# Patient Record
Sex: FEMALE | Race: WHITE | URBAN - METROPOLITAN AREA
[De-identification: names, ages, dates, MRNs, and addresses within clinical notes are randomized per-mention and may not be internally consistent; named-entity substitution may affect disease eponyms.]

---

## 2020-07-25 ENCOUNTER — TELEPHONE ENCOUNTER (OUTPATIENT)
Dept: URBAN - METROPOLITAN AREA CLINIC 13 | Facility: CLINIC | Age: 75
End: 2020-07-25

## 2020-07-25 RX ORDER — POLYETHYLENE GLYCOL 3350, SODIUM CHLORIDE, SODIUM BICARBONATE AND POTASSIUM CHLORIDE WITH LEMON FLAVOR 420; 11.2; 5.72; 1.48 G/4L; G/4L; G/4L; G/4L
TAKE 1/2 GALLON AT 5:00 PM DAY BEFORE PROCEDURE, TAKE SECOND 1/2 OF GALLON 6 HRS PRIOR TO PROCEDURE POWDER, FOR SOLUTION ORAL
Qty: 1 | Refills: 0 | OUTPATIENT
Start: 2018-12-31 | End: 2019-01-07

## 2020-07-26 ENCOUNTER — TELEPHONE ENCOUNTER (OUTPATIENT)
Dept: URBAN - METROPOLITAN AREA CLINIC 13 | Facility: CLINIC | Age: 75
End: 2020-07-26

## 2020-07-26 RX ORDER — TOBRAMYCIN AND DEXAMETHASONE 3; 1 MG/ML; MG/ML
SUSPENSION/ DROPS OPHTHALMIC
Qty: 5 | Refills: 0 | Status: ACTIVE | COMMUNITY
Start: 2018-08-20

## 2020-07-26 RX ORDER — TRAMADOL HYDROCHLORIDE 50 MG/1
TABLET ORAL
Qty: 20 | Refills: 0 | Status: ACTIVE | COMMUNITY
Start: 2018-07-12

## 2020-07-26 RX ORDER — CEPHALEXIN 500 MG/1
CAPSULE ORAL
Qty: 30 | Refills: 0 | Status: ACTIVE | COMMUNITY
Start: 2018-08-20

## 2020-07-26 RX ORDER — LOSARTAN POTASSIUM 100 MG/1
TABLET, FILM COATED ORAL
Qty: 90 | Refills: 0 | Status: ACTIVE | COMMUNITY
Start: 2018-10-18

## 2020-07-26 RX ORDER — LOSARTAN POTASSIUM 100 MG/1
TAKE 1 TABLET DAILY TABLET, FILM COATED ORAL
Refills: 0 | Status: ACTIVE | COMMUNITY

## 2025-03-11 ENCOUNTER — DASHBOARD ENCOUNTERS (OUTPATIENT)
Age: 80
End: 2025-03-11

## 2025-03-11 ENCOUNTER — LAB OUTSIDE AN ENCOUNTER (OUTPATIENT)
Dept: URBAN - METROPOLITAN AREA CLINIC 107 | Facility: CLINIC | Age: 80
End: 2025-03-11

## 2025-03-11 ENCOUNTER — OFFICE VISIT (OUTPATIENT)
Dept: URBAN - METROPOLITAN AREA CLINIC 107 | Facility: CLINIC | Age: 80
End: 2025-03-11
Payer: COMMERCIAL

## 2025-03-11 VITALS
SYSTOLIC BLOOD PRESSURE: 146 MMHG | OXYGEN SATURATION: 97 % | HEART RATE: 69 BPM | WEIGHT: 154 LBS | RESPIRATION RATE: 18 BRPM | DIASTOLIC BLOOD PRESSURE: 83 MMHG | HEIGHT: 60 IN | TEMPERATURE: 97.2 F | BODY MASS INDEX: 30.23 KG/M2

## 2025-03-11 DIAGNOSIS — E73.9 LACTOSE INTOLERANCE: ICD-10-CM

## 2025-03-11 DIAGNOSIS — R11.0 CHRONIC NAUSEA: ICD-10-CM

## 2025-03-11 DIAGNOSIS — R19.7 CHRONIC DIARRHEA: ICD-10-CM

## 2025-03-11 DIAGNOSIS — R63.4 WEIGHT LOSS, UNINTENTIONAL: ICD-10-CM

## 2025-03-11 PROBLEM — 448765001: Status: ACTIVE | Noted: 2025-03-11

## 2025-03-11 PROBLEM — 236071009: Status: ACTIVE | Noted: 2025-03-11

## 2025-03-11 PROBLEM — 422587007: Status: ACTIVE | Noted: 2025-03-11

## 2025-03-11 PROBLEM — 782415009: Status: ACTIVE | Noted: 2025-03-11

## 2025-03-11 PROCEDURE — 99203 OFFICE O/P NEW LOW 30 MIN: CPT | Performed by: INTERNAL MEDICINE

## 2025-03-11 RX ORDER — MELOXICAM 15 MG/1
TABLET ORAL
Qty: 90 EACH | Refills: 1 | Status: ACTIVE | COMMUNITY

## 2025-03-11 RX ORDER — LOSARTAN POTASSIUM 100 MG/1
TAKE 1 TABLET DAILY TABLET, FILM COATED ORAL
Refills: 0 | Status: ACTIVE | COMMUNITY

## 2025-03-11 RX ORDER — TRAMADOL HYDROCHLORIDE 50 MG/1
TABLET, FILM COATED ORAL
Qty: 20 | Refills: 0 | Status: ACTIVE | COMMUNITY
Start: 2018-07-12

## 2025-03-11 RX ORDER — AMLODIPINE BESYLATE 10 MG/1
TABLET ORAL
Qty: 90 EACH | Refills: 1 | Status: ACTIVE | COMMUNITY

## 2025-03-11 RX ORDER — TOBRAMYCIN AND DEXAMETHASONE 3; 1 MG/ML; MG/ML
SUSPENSION/ DROPS OPHTHALMIC
Qty: 5 | Refills: 0 | Status: DISCONTINUED | COMMUNITY
Start: 2018-08-20

## 2025-03-11 RX ORDER — CEPHALEXIN 500 MG/1
CAPSULE ORAL
Qty: 30 | Refills: 0 | Status: DISCONTINUED | COMMUNITY
Start: 2018-08-20

## 2025-03-11 NOTE — HPI-TODAY'S VISIT:
79-year-old with a history of diabetes mellitus, hypertension, hyperlipidemia, sleep apnea who presents for evaluation of diarrhea and mucus in her stool.  She was evaluated by Dr. Sena previously in 2018 and 2019 for similar complaints.  She would like to continue with me  In 2018/2019 TTG celiac sprue test was negative, ova and parasites negative, Giardia negative and fecal fat were negative.  TSH was normal at 2.69.  He performed a colonoscopy on 1/7/2019 that revealed a normal terminal ileum, the colon was normal and random biopsies revealed no significant abnormality specifically no microscopic colitis.  There were a few small sigmoid diverticuli.  For the last 2 months she has had nausea.  It comes and goes but is there most of the time.  It does not seem to be worse with meals.  She does not have any vomiting.  She has had some anorexia and has lost about 8 pounds in the last month or so.  She seldom has heartburn and denies any dysphagia there is been no abdominal pain gaseousness or bloating.  Her bowel movements occur about 4-5 times per day then she can skip a day or 2 and then have 3 in the morning.  These vary from soft to sometimes watery.  She is trying to follow a lactose-free diet.  There is been no bright red blood or melena.  There is been no skin rashes.  She does use meloxicam and sometimes uses Aleve.  She states that she is living on [facility].  Blood work on 1/2/2025 revealed a hemoglobin 12.1, WBC of 5.4 and platelet count 269,000.  Sodium 141 potassium 4.6 BUN 12 creatinine 0.72.  AST 23, ALT 21, alk phosphatase 78, total bili 0.4, albumin 4.4, TSH is 4.5, A1c is 6.4.

## 2025-03-19 ENCOUNTER — TELEPHONE ENCOUNTER (OUTPATIENT)
Dept: URBAN - METROPOLITAN AREA CLINIC 107 | Facility: CLINIC | Age: 80
End: 2025-03-19

## 2025-03-21 ENCOUNTER — TELEPHONE ENCOUNTER (OUTPATIENT)
Dept: URBAN - METROPOLITAN AREA CLINIC 107 | Facility: CLINIC | Age: 80
End: 2025-03-21

## 2025-04-14 ENCOUNTER — CLAIMS CREATED FROM THE CLAIM WINDOW (OUTPATIENT)
Dept: URBAN - METROPOLITAN AREA CLINIC 4 | Facility: CLINIC | Age: 80
End: 2025-04-14
Payer: COMMERCIAL

## 2025-04-14 ENCOUNTER — CLAIMS CREATED FROM THE CLAIM WINDOW (OUTPATIENT)
Dept: URBAN - METROPOLITAN AREA SURGERY CENTER 25 | Facility: SURGERY CENTER | Age: 80
End: 2025-04-14
Payer: COMMERCIAL

## 2025-04-14 DIAGNOSIS — K31.89 OTHER DISEASES OF STOMACH AND DUODENUM: ICD-10-CM

## 2025-04-14 DIAGNOSIS — K31.89 GASTRIC FOVEOLAR HYPERPLASIA: ICD-10-CM

## 2025-04-14 DIAGNOSIS — K29.70 GASTRITIS, UNSPECIFIED, WITHOUT BLEEDING: ICD-10-CM

## 2025-04-14 DIAGNOSIS — K29.60 OTHER GASTRITIS WITHOUT BLEEDING: ICD-10-CM

## 2025-04-14 PROCEDURE — 43239 EGD BIOPSY SINGLE/MULTIPLE: CPT | Performed by: INTERNAL MEDICINE

## 2025-04-14 PROCEDURE — 00731 ANES UPR GI NDSC PX NOS: CPT | Performed by: NURSE ANESTHETIST, CERTIFIED REGISTERED

## 2025-04-14 PROCEDURE — 88305 TISSUE EXAM BY PATHOLOGIST: CPT | Performed by: PATHOLOGY

## 2025-04-14 PROCEDURE — 88312 SPECIAL STAINS GROUP 1: CPT | Performed by: PATHOLOGY

## 2025-04-14 RX ORDER — AMLODIPINE BESYLATE 10 MG/1
TABLET ORAL
Qty: 90 EACH | Refills: 1 | Status: ACTIVE | COMMUNITY

## 2025-04-14 RX ORDER — MELOXICAM 15 MG/1
TABLET ORAL
Qty: 90 EACH | Refills: 1 | Status: ACTIVE | COMMUNITY

## 2025-04-14 RX ORDER — LOSARTAN POTASSIUM 100 MG/1
TAKE 1 TABLET DAILY TABLET, FILM COATED ORAL
Refills: 0 | Status: ACTIVE | COMMUNITY

## 2025-04-14 RX ORDER — TRAMADOL HYDROCHLORIDE 50 MG/1
TABLET, FILM COATED ORAL
Qty: 20 | Refills: 0 | Status: ACTIVE | COMMUNITY
Start: 2018-07-12

## 2025-05-06 ENCOUNTER — TELEPHONE ENCOUNTER (OUTPATIENT)
Dept: URBAN - METROPOLITAN AREA CLINIC 107 | Facility: CLINIC | Age: 80
End: 2025-05-06

## 2025-05-07 ENCOUNTER — OFFICE VISIT (OUTPATIENT)
Dept: URBAN - METROPOLITAN AREA CLINIC 107 | Facility: CLINIC | Age: 80
End: 2025-05-07
Payer: COMMERCIAL

## 2025-05-07 ENCOUNTER — LAB OUTSIDE AN ENCOUNTER (OUTPATIENT)
Dept: URBAN - METROPOLITAN AREA CLINIC 107 | Facility: CLINIC | Age: 80
End: 2025-05-07

## 2025-05-07 DIAGNOSIS — R63.4 UNINTENTIONAL WEIGHT LOSS: ICD-10-CM

## 2025-05-07 DIAGNOSIS — E73.9 LACTOSE INTOLERANCE: ICD-10-CM

## 2025-05-07 DIAGNOSIS — R19.01 RIGHT UPPER QUADRANT ABDOMINAL MASS: ICD-10-CM

## 2025-05-07 DIAGNOSIS — R11.0 NAUSEA: ICD-10-CM

## 2025-05-07 DIAGNOSIS — K52.9 CHRONIC DIARRHEA: ICD-10-CM

## 2025-05-07 DIAGNOSIS — R93.5 ABNORMAL ABDOMINAL CT SCAN: ICD-10-CM

## 2025-05-07 PROCEDURE — 99214 OFFICE O/P EST MOD 30 MIN: CPT | Performed by: NURSE PRACTITIONER

## 2025-05-07 RX ORDER — AMLODIPINE BESYLATE 10 MG/1
TABLET ORAL
Qty: 90 EACH | Refills: 1 | Status: ACTIVE | COMMUNITY

## 2025-05-07 RX ORDER — LOSARTAN POTASSIUM 100 MG/1
TAKE 1 TABLET DAILY TABLET, FILM COATED ORAL
Refills: 0 | Status: ACTIVE | COMMUNITY

## 2025-05-07 RX ORDER — TRAMADOL HYDROCHLORIDE 50 MG/1
TABLET, FILM COATED ORAL
Qty: 20 | Refills: 0 | Status: ACTIVE | COMMUNITY
Start: 2018-07-12

## 2025-05-07 RX ORDER — ONDANSETRON 4 MG/1
1 TABLET ON THE TONGUE AND ALLOW TO DISSOLVE TABLET, ORALLY DISINTEGRATING ORAL
Qty: 30 TABLET | Refills: 1 | OUTPATIENT
Start: 2025-05-07

## 2025-05-07 RX ORDER — MELOXICAM 15 MG/1
TABLET ORAL
Qty: 90 EACH | Refills: 1 | Status: ACTIVE | COMMUNITY

## 2025-05-07 NOTE — HPI-TODAY'S VISIT:
79-year-old with a history of diabetes mellitus, hypertension, hyperlipidemia, sleep apnea who presents for EGD follow-up. She was evaluated by Dr. Sena previously in 2018 and 2019 for similar   Last seen 3/11/2025 for chronic nausea, weight loss, lactose intolerance and chronic diarrhea. EGD ordered for further evaluation. She does use NSAIDs it was recommended she discontinue and take acetaminophen instead. Chronic diarrhea recommend Benefiber and avoidance of lactose.  In 2018/2019 TTG celiac sprue test was negative, ova and parasites negative, Giardia negative and fecal fat were negative.  TSH was normal at 2.69.  He performed a colonoscopy on 1/7/2019 that revealed a normal terminal ileum, the colon was normal and random biopsies revealed no significant abnormality specifically no microscopic colitis.  There were a few small sigmoid diverticuli.  For the last 2 months she has had nausea.  It comes and goes but is there most of the time.  It does not seem to be worse with meals.  She does not have any vomiting.  She has had some anorexia and has lost about 8 pounds in the last month or so.  She seldom has heartburn and denies any dysphagia there is been no abdominal pain gaseousness or bloating.  Her bowel movements occur about 4-5 times per day then she can skip a day or 2 and then have 3 in the morning.  These vary from soft to sometimes watery.  She is trying to follow a lactose-free diet.  There is been no bright red blood or melena.  There is been no skin rashes.  She does use meloxicam and sometimes uses Aleve.  She states that she is living on [facility].  Blood work on 1/2/2025 revealed a hemoglobin 12.1, WBC of 5.4 and platelet count 269,000.  Sodium 141 potassium 4.6 BUN 12 creatinine 0.72.  AST 23, ALT 21, alk phosphatase 78, total bili 0.4, albumin 4.4, TSH is 4.5, A1c is 6.4.  Interval history, 5/7/2025 EGD 4/14/2025 revealed patchy nonerosive gastritis with erythema and adherent blood.  Duodenum and esophagus normal.  Gastric biopsy revealed gastropathy no H. pylori or IM.  Duodenal biopsy normal. CT abdomen and pelvis with contrast 1/25/2024.  Adjacent to lateral segment of hepatic lobe 2.9 x 3.5 x 2.2 cm partially calcified mass.  Abuts proximal stomach, esophagus and medial left hemidiaphragm as well as lateral segment of the left hepatic lobe.  The liver is grossly unremarkable.  She is here with her daughter today. They nash a copy of a CT report with listed above. No MRI or repeat CT imaging since 1/2024.  Weight is down 10 pounds.  Still has nausea, able to eat spiceier foods, takes meloxicam as needed.  Denies tramadol use. Tried benefiber, has helped with loose stools, no melena or hematochezia. Pt states she has IUD for 9 yrs, has not had intercourse for 5 years and does not get menstrual periods.  Would like to defer pregnancy test.  Dr. Brown stated no pregnancy test needed.

## 2025-05-08 ENCOUNTER — TELEPHONE ENCOUNTER (OUTPATIENT)
Dept: URBAN - METROPOLITAN AREA CLINIC 113 | Facility: CLINIC | Age: 80
End: 2025-05-08

## 2025-05-09 ENCOUNTER — TELEPHONE ENCOUNTER (OUTPATIENT)
Dept: URBAN - METROPOLITAN AREA CLINIC 107 | Facility: CLINIC | Age: 80
End: 2025-05-09

## 2025-05-15 ENCOUNTER — TELEPHONE ENCOUNTER (OUTPATIENT)
Dept: URBAN - METROPOLITAN AREA CLINIC 113 | Facility: CLINIC | Age: 80
End: 2025-05-15

## 2025-06-10 ENCOUNTER — TELEPHONE ENCOUNTER (OUTPATIENT)
Dept: URBAN - METROPOLITAN AREA CLINIC 113 | Facility: CLINIC | Age: 80
End: 2025-06-10

## 2025-06-16 ENCOUNTER — TELEPHONE ENCOUNTER (OUTPATIENT)
Dept: URBAN - METROPOLITAN AREA CLINIC 107 | Facility: CLINIC | Age: 80
End: 2025-06-16